# Patient Record
Sex: MALE | Race: WHITE
[De-identification: names, ages, dates, MRNs, and addresses within clinical notes are randomized per-mention and may not be internally consistent; named-entity substitution may affect disease eponyms.]

---

## 2017-11-29 NOTE — ER DOCUMENT REPORT
ED Cardiac





- General


Chief Complaint: Chest Pain


Stated Complaint: CHEST PAIN


Time Seen by Provider: 11/29/17 15:40


Mode of Arrival: Ambulatory


Information source: Patient


Notes: 





This is a 57-year-old man status post recent coronary stent placement ([place])

, presented to the emergency room with retrosternal chest pain similar to 

previous cardiac symptoms.  Patient states that he had the pain yesterday and 

it resolved after 2 nitroglycerin.  He pain today, had taken a nitroglycerin, 

it did not improve and he came to the ER.  In the ER, he was given IV morphine 

and the pain resolved after that.  The patient states that the pain lasted 

approximately 45 minutes and total today.


TRAVEL OUTSIDE OF THE [place] IN LAST 30 DAYS: No





- HPI


Patient complains to provider of: Chest pain


Use of: denies: Alcohol, Amphetamines, Bath salts, Caffeine, Cocaine, 

Decongestants, Other


Quality of pain: Achy, Dull


Chest pain radiation location: Left shoulder


Severity now: Mild


Severity at worst: Moderate


Pain level currently: 1


Cardiac risk factors: None


Positive cardiac history: No


Associated symptoms: None


Exacerbated by: Denies


Relieved by: Nothing


Similar symptoms previously: Yes


Recently seen / treated by doctor: Yes





- Related Data


Allergies/Adverse Reactions: 


 





No Known Allergies Allergy (Verified 11/29/17 15:03)


 











Past Medical History





- General


Information source: Patient





- Social History


Smoking Status: Current Every Day Smoker


Cigarette use (# per day): Yes - Half pack per day


Chew tobacco use (# tins/day): No


Smoking Education Provided: No


Frequency of alcohol use: None


Drug Abuse: None


Lives with: Spouse/Significant other


Family History: Reviewed & Not Pertinent


Patient has suicidal ideation: No


Patient has homicidal ideation: No





- Past Medical History


Cardiac Medical History: Reports: Hx Coronary Artery Disease, Hx 

Hypercholesterolemia, Hx Hypertension


Pulmonary Medical History: 


   Denies: Hx Tuberculosis


Endocrine Medical History: Reports: Hx Diabetes Mellitus Type 2


Past Surgical History: Reports: Hx Appendectomy





- Immunizations


Hx Diphtheria, Pertussis, Tetanus Vaccination: No - unknown


Hx Pneumococcal Vaccination: 10/01/11





Review of Systems





- Review of Systems


Constitutional: denies: Chills, Fever


EENT: No symptoms reported


Cardiovascular: See HPI


Respiratory: No symptoms reported


Gastrointestinal: No symptoms reported


Genitourinary: No symptoms reported


Male Genitourinary: No symptoms reported


Musculoskeletal: No symptoms reported


Skin: No symptoms reported


Hematologic/Lymphatic: No symptoms reported


Neurological/Psychological: No symptoms reported





Physical Exam





- Vital signs


Vitals: 


 











Resp BP Pulse Ox


 


 19   112/77   96 


 


 11/29/17 16:01  11/29/17 16:01  11/29/17 16:01











Notes: 





Physical exam:


 


GENERAL: The 7-year-old man, alert and oriented 3, no acute distress.





HEAD: Atraumatic, normocephalic.





EYES: Pupils equal round and reactive to light, extraocular movements intact, 

sclera anicteric, conjunctiva are normal.





ENT: TMs normal, nares patent, oropharynx clear without exudates.  Moist mucous 

membranes.





NECK: Normal range of motion, supple without obvious mass or JVD.





LUNGS: Breath sounds clear to auscultation bilaterally and equal.  No wheezes 

rales or rhonchi.





HEART: Regular rate and rhythm without murmurs, rubs or gallops.





ABDOMEN: Soft, normoactive bowel sounds.  No tenderness to palpation.  No 

guarding, no rebound.  No masses appreciated.





EXTREMITIES: Normal range of motion, no pitting or edema.  No clubbing or 

cyanosis.





NEUROLOGICAL: Cranial nerves II through XII grossly intact.  Normal speech, 

moving all extremities.





PSYCH: Normal mood, normal affect.





SKIN: Warm, Dry, normal turgor, no rashes or lesions noted.





Course





- Re-evaluation


Re-evalutation: 





11/29/17 16:22


Note: Patient taken 2 nitroglycerin prior to arrival without significant 

relief.  He became chest pain-free after 4 mg of IV morphine.  The IV nitro 

drip had not been started at that point.  Will treat with Nitropaste.  The 

repeat EKG is essentially unchanged.  There was slight difference in the 2 EKGs 

because the leads were not Done in between the serial EKGs (i.e. the leads had 

to be replaced for the second EKG).  Continue to observe in the ER.


11/29/17 16:23





11/29/17 22:23


The patient was observed in the ER and the troponin was repeated.  There was a 

slight elevation from the previous one.  I discussed this and given the patient'

s significant cardiac history, I recommended transfer to [place].  I had a 

long discussion with the patient because he is adamantly against it.  I 

discussed also with the patient's wife is at the bedside.  The patient is 

mentating clearly and appears reasonable, he states that he will follow-up with 

the cardiologist in the morning.  I have watched him for a prolonged period in 

the ER and he has been chest pain-free for the last several hours.  I have 

given him a copy of today's labs.  He will leave AMA: He is aware of this.  I 

have also assured him that if he gets any further pain, to return here at once.


11/29/17 22:24








- Vital Signs


Vital signs: 


 











Temp Pulse Resp BP Pulse Ox


 


 97.4 F      18   133/88 H  96 


 


 11/29/17 22:27     11/29/17 22:16  11/29/17 22:16  11/29/17 22:16














- Laboratory


Result Diagrams: 


 11/29/17 15:41





 11/29/17 15:41


Laboratory results interpreted by me: 


 











  11/29/17 11/29/17





  15:41 15:41


 


WBC  10.7 H 


 


RDW  15.3 H 


 


Chloride   110 H


 


Creatine Kinase   50 L














- Diagnostic Test


Radiology reviewed: Image reviewed, Reports reviewed - Chest x-ray shows no 

infiltrates or effusions.





- EKG Interpretation by Me


Rate: Normal


Rhythm: NSR - EKG shows normal sinus rhythm with a ventricular rate of 63, 

there are inverted T waves in V1 and V2, no ST elevations,.  Last EKG for 

comparison is 2014 (this is prior to all of his cardiac interventions).





Discharge





- Discharge


Clinical Impression: 


 Chest pain





Condition: Stable


Disposition: AGAINST MEDICAL ADVICE


Additional Instructions: 


As we discussed, I would like you to call your cardiologist first thing in the 

morning.  Bring a copy of today's lab work with you.


In the meantime, if you get any further chest pain, return to the emergency 

room at once.


Continue aspirin and other medicines.

## 2017-11-29 NOTE — EKG REPORT
SEVERITY:- ABNORMAL ECG -

SINUS RHYTHM

NONSPECIFIC  ANTERIOR ST-T INVERSION

:

Confirmed by: Jayden Deluca MD 29-Nov-2017 20:14:48

## 2017-11-29 NOTE — RADIOLOGY REPORT (SQ)
EXAM DESCRIPTION:  CHEST SINGLE VIEW



COMPLETED DATE/TIME:  11/29/2017 4:13 pm



REASON FOR STUDY:  chest pain



COMPARISON:  12/9/2014.



EXAM PARAMETERS:  NUMBER OF VIEWS: One view.

TECHNIQUE: Single frontal radiographic view of the chest acquired.

RADIATION DOSE: NA

LIMITATIONS: None.



FINDINGS:  LUNGS AND PLEURA: No opacities, masses or pneumothorax. No pleural effusion.

MEDIASTINUM AND HILAR STRUCTURES: No masses.  Contour normal.

HEART AND VASCULAR STRUCTURES: Heart normal in size.  Normal vasculature.

BONES: No acute findings.

HARDWARE: None in the chest.

OTHER: No other significant finding.



IMPRESSION:  NO ACUTE RADIOGRAPHIC FINDING IN THE CHEST.



TECHNICAL DOCUMENTATION:  JOB ID:  [id number]

 2011 [employer]- All Rights Reserved

## 2017-11-29 NOTE — EKG REPORT
SEVERITY:- ABNORMAL ECG -

SINUS RHYTHM

ABNORMAL T, CONSIDER ISCHEMIA, ANTERIOR LEADS

BORDERLINE ST ELEVATION, INFERIOR LEADS

:

Confirmed by: Jayden Deluca MD 29-Nov-2017 20:13:43

## 2018-04-06 NOTE — ER DOCUMENT REPORT
ED Extremity Problem, Lower





- General


Chief Complaint: Leg Pain


Stated Complaint: LEG PAIN


Time Seen by Provider: 04/06/18 16:40


Notes: 





Patient has a history of a metal blanka insertion in his right femur a year or so 

ago, which developed osteomyelitis.  He has had repeat surgeries to drain 

infections.   He has an appointment Monday to be seen by a specialist at [facility] in 

[place], but is out of pain medications at this time.  When he has pain 

like he is currently having, patient says that his blood pressure goes up. He 

says that the distal incision on the right thigh has been leaking for about the 

past month.  He is currently being maintained on doxycycline.  Has not noted 

any fever.  Walks with a slight limp, but is able to ambulate relatively well.


TRAVEL OUTSIDE OF THE [place] IN LAST 30 DAYS: No





- Related Data


Allergies/Adverse Reactions: 


 





No Known Allergies Allergy (Verified 04/06/18 16:18)


 











Past Medical History





- Social History


Smoking Status: Current Every Day Smoker


Chew tobacco use (# tins/day): No


Frequency of alcohol use: None


Drug Abuse: None


Family History: Reviewed & Not Pertinent


Patient has suicidal ideation: No


Patient has homicidal ideation: No





- Past Medical History


Cardiac Medical History: Reports: Hx Coronary Artery Disease, Hx 

Hypercholesterolemia, Hx Hypertension


Endocrine Medical History: Reports: Hx Diabetes Mellitus Type 2


GI Medical History: Reports: Hx Hiatal Hernia


Past Surgical History: Reports: Hx Appendectomy, Hx Cardiac Catheterization - 

stents 2 seperate times same one, Hx Orthopedic Surgery - titanium blanka rt leg





- Immunizations


Hx Diphtheria, Pertussis, Tetanus Vaccination: No - unknown


Hx Pneumococcal Vaccination: 10/01/11





Review of Systems





- Review of Systems


Notes: 





CONSTITUTIONAL :  Denies fever.


  


CARDIOVASCULAR:  Denies chest pain.





RESPIRATORY:  Denies cough, chest congestion, or shortness of breath.





GASTROINTESTINAL:  Denies abdominal pain or nausea, vomiting, or diarrhea.





GENITOURINARY:  Denies difficulty or painful urinating, urinary frequency, 

blood in urine.





Extremities: Healed incision except for a small opening distally in the right 

medial thigh without signs of infection.








Physical Exam





- Vital signs


Vitals: 


 











Temp Pulse Resp BP Pulse Ox


 


 97.9 F   82   20   168/112 H  96 


 


 04/06/18 16:22  04/06/18 16:22  04/06/18 16:22  04/06/18 16:22  04/06/18 16:22











Interpretation: Hypertensive - Moderate





- Notes


Notes: 





PHYSICAL EXAMINATION:





GENERAL: Well-appearing, in no acute distress.  Blood pressure up moderately.





HEAD: Atraumatic, normocephalic.





LUNGS: Breath sounds clear and equal bilaterally.





HEART: Regular rate and rhythm without murmurs.





ABDOMEN: Soft, nontender.  No guarding or rebound.  No masses.





BACK:  No tenderness throughout entire back.





EXTREMITIES: Normal range of motion without pain.  Patient has a well-healed 

incision of the right medial thigh with only the distal portion having an 

opening of about 1 cm diameter through which the patient says he has copious 

drainage daily.  However, there is no drainage from the site at this time.  

There is also no swelling or anything suggestive of fluctuance or abscess 

collection.  There is no erythema, heat, or even very significant tenderness to 

touch.





NEUROLOGICAL:  Normal speech, normal gait.  Normal sensory, motor, and reflex 

exams.  Awake, alert, and oriented x3.  Cranial nerves normal.





PSYCH: Normal mood, normal affect.





SKIN: Warm, dry, no rashes.





Course





- Re-evaluation


Re-evalutation: 





04/06/18 19:24


Patient's x-ray shows nothing specific.  White cell count slightly elevated.  

Patient is on doxycycline already.  He is also on blood pressure medicines.  

Blood pressure taken just before discharge was down compared to his initial 

presentation.





Since patient has a definite cut off date that he is going to be seeing another 

doctor who can manage his pain, I do not mind giving the patient does not 

Percocet tens today and I told him that we will not be able to continue to give 

him pain medications for his leg, that will have to be done by his doctor 

locally or the specialist in [place].  Patient says he understands.





- Vital Signs


Vital signs: 


 











Temp Pulse Resp BP Pulse Ox


 


 98.3 F   78   16   169/103 H  95 


 


 04/06/18 17:32  04/06/18 17:32  04/06/18 17:32  04/06/18 17:32  04/06/18 17:32














- Laboratory


Result Diagrams: 


 04/06/18 16:55





 04/06/18 16:55


Laboratory results interpreted by me: 


 











  04/06/18





  16:55


 


WBC  11.7 H


 


RBC  5.83 H


 


RDW  15.5 H


 


ESR  31 H














Discharge





- Discharge


Clinical Impression: 


 Right thigh pain, Osteomyelitis of femur





Condition: Stable


Disposition: HOME, SELF-CARE


Additional Instructions: 


Leg Pain, Nonspecific





     We did not find an obvious cause for your leg pain. There's no sign of 

blood clot, infection, or other serious disease.


     Possible causes of vague leg pain include muscle or joint inflammation, 

disc disease in the lower back, pressure on the nerves in the back, or reduced 

blood flow through the arteries of the leg.


     Rest the leg. Pain can be eased with an antiinflammatory pain medicine 

such as ibuprofen. If the pain involves a small area, a heating pad might help. 


     Call the doctor or return if the leg becomes swollen, weak, discolored, or 

increasingly painful, or if you develop any other significant change in your 

health.





Osteomyelitis





     You have been diagnosed as having osteomyelitis -- an infection in the 

bone.  This type of infection is much more serious than simple skin infections 

and must be treated aggressively.  Osteomyelitis usually results from cuts or 

puncture wounds which allow germs to get into the bone.  It can also occur 

spontaneously from germs in the blood stream.


     The usual treatment is intravenous antibiotics.  With newer antibiotics, 

this can often be done outside the hospital.  Surgery to clean out the infected 

bone is often necessary.  You MUST keep all appointments and get your 

antibiotics as instructed -- osteomyelitis is a serious problem.


     You should go to the emergency room or contact your physician if you have 

a dramatic increase in pain, redness, or swelling, or if you develop shaking 

chills, fever, or rash.








Oral Narcotic Medication





     You have been given a prescription for pain control.  This medication is a 

narcotic.  It's best taken with food, as nausea can result if taken on an empty 

stomach.


     Don't operate machinery or drive within six hours of taking this 

medication.  Do not combine this medicine with alcohol, or with any medication 

which can cause sedation (such as cold tablets or sleeping pills) unless you 

get permission from the physician.


     Narcotics tend to cause constipation.  If possible, drink plenty of fluids 

and eat a diet high in fiber and fruits.








Continue to take your current medications including your antibiotic.  Take your 

blood pressure medicines as prescribed.  Keep your appointment to see your 

specialist at [facility] in [place] Monday, as scheduled.





FOLLOW-UP CARE:


If you have been referred to a physician for follow-up care, call the physician

s office for an appointment as you were instructed or within the next two days.

  If you experience worsening or a significant change in your symptoms, notify 

the physician immediately or return to the Emergency Department at any time for 

re-evaluation.


Prescriptions: 


Oxycodone HCl/Acetaminophen [Percocet  Mg Tablet] 1 each PO Q4HP PRN #12 

tablet


 PRN Reason:

## 2018-04-06 NOTE — ER DOCUMENT REPORT
ED Medical Screen (RME)





- General


Chief Complaint: Leg Pain


Stated Complaint: LEG PAIN


Time Seen by Provider: 04/06/18 16:40


Notes: 





RME DISCLOSURE


I have seen this patient as part of a Rapid Medical Evaluation and, if 

applicable, placed any initially appropriate orders. The patient will be seen 

and fully evaluated, including a full history and physical exam, by a provider (

in Main ED or Fast Track) when a room becomes available.





------------------------------------------------------------------





57-year-old male here with complaints of right thigh pain ongoing for the past 

few months but progressively worsening over the past few days.  He states that 

he has a history of osteomyelitis and that he has noticed over the recent past 

that he has yellow purulence running down his leg especially at night.  His 

orthopedic doctor is at [facility].


TRAVEL OUTSIDE OF THE [place] IN LAST 30 DAYS: No





- Related Data


Allergies/Adverse Reactions: 


 





No Known Allergies Allergy (Verified 04/06/18 16:18)


 











Past Medical History





- Social History


Chew tobacco use (# tins/day): No


Frequency of alcohol use: None


Drug Abuse: None





- Past Medical History


Cardiac Medical History: Reports: Hx Coronary Artery Disease, Hx 

Hypercholesterolemia, Hx Hypertension


Pulmonary Medical History: 


   Denies: Hx Tuberculosis


Endocrine Medical History: Reports: Hx Diabetes Mellitus Type 2


Renal/ Medical History: Denies: Hx Peritoneal Dialysis


GI Medical History: Reports: Hx Hiatal Hernia


Past Surgical History: Reports: Hx Appendectomy, Hx Cardiac Catheterization - 

stents 2 seperate times same one, Hx Orthopedic Surgery - titanium blanka rt leg





- Immunizations


Hx Diphtheria, Pertussis, Tetanus Vaccination: No - unknown





Physical Exam





- Vital signs


Vitals: 





 











Temp Pulse Resp BP Pulse Ox


 


 97.9 F   82   20   168/112 H  96 


 


 04/06/18 16:22  04/06/18 16:22  04/06/18 16:22  04/06/18 16:22  04/06/18 16:22














Course





- Vital Signs


Vital signs: 





 











Temp Pulse Resp BP Pulse Ox


 


 97.9 F   82   20   168/112 H  96 


 


 04/06/18 16:22  04/06/18 16:22  04/06/18 16:22  04/06/18 16:22  04/06/18 16:22

## 2018-04-06 NOTE — RADIOLOGY REPORT (SQ)
EXAM DESCRIPTION:  FEMUR RIGHT



COMPLETED DATE/TIME:  4/6/2018 5:25 pm



REASON FOR STUDY:  purulence from titanium blanka; eval osteomyelitis



COMPARISON:  None.



NUMBER OF VIEWS:  Two views.



TECHNIQUE:  Two radiographic images acquired of the right femur to include hip and knee in at least o
ne projection.



LIMITATIONS:  None.



FINDINGS:  MINERALIZATION: The mineralization in the femoral metaphysis is somewhat heterogeneous.

BONES: No acute fracture.

SOFT TISSUES: No obvious swelling or foreign body.

OTHER: There is long medullary blanka in the femur and two long screws in the femoral metaphysis.



IMPRESSION:  Cannot exclude osteomyelitis in the distal femur.



TECHNICAL DOCUMENTATION:  JOB ID:  [id number]

 2011 [employer]- All Rights Reserved



Reading location - IP/workstation name: [email or web address]

## 2018-05-09 ENCOUNTER — HOSPITAL ENCOUNTER (EMERGENCY)
Dept: HOSPITAL 62 - ER | Age: 58
Discharge: LEFT BEFORE DISCHARGE | End: 2018-05-09
Payer: SELF-PAY

## 2018-05-09 VITALS — SYSTOLIC BLOOD PRESSURE: 145 MMHG | DIASTOLIC BLOOD PRESSURE: 95 MMHG

## 2018-05-09 DIAGNOSIS — I10: ICD-10-CM

## 2018-05-09 DIAGNOSIS — I25.10: ICD-10-CM

## 2018-05-09 DIAGNOSIS — E11.9: ICD-10-CM

## 2018-05-09 DIAGNOSIS — Z53.20: ICD-10-CM

## 2018-05-09 DIAGNOSIS — M79.604: Primary | ICD-10-CM

## 2018-05-09 LAB
ADD MANUAL DIFF: NO
ANION GAP SERPL CALC-SCNC: 15 MMOL/L (ref 5–19)
BASOPHILS # BLD AUTO: 0.1 10^3/UL (ref 0–0.2)
BASOPHILS NFR BLD AUTO: 0.8 % (ref 0–2)
BUN SERPL-MCNC: 13 MG/DL (ref 7–20)
CALCIUM: 9.8 MG/DL (ref 8.4–10.2)
CHLORIDE SERPL-SCNC: 105 MMOL/L (ref 98–107)
CK SERPL-CCNC: 52 U/L (ref 55–170)
CO2 SERPL-SCNC: 24 MMOL/L (ref 22–30)
CRP SERPL-MCNC: 41.4 MG/L (ref ?–10)
EOSINOPHIL # BLD AUTO: 0.2 10^3/UL (ref 0–0.6)
EOSINOPHIL NFR BLD AUTO: 1.6 % (ref 0–6)
ERYTHROCYTE [DISTWIDTH] IN BLOOD BY AUTOMATED COUNT: 15.1 % (ref 11.5–14)
ERYTHROCYTE [SEDIMENTATION RATE] IN BLOOD: 44 MM/HR (ref 0–20)
GLUCOSE SERPL-MCNC: 113 MG/DL (ref 75–110)
HCT VFR BLD CALC: 46.7 % (ref 37.9–51)
HGB BLD-MCNC: 15.5 G/DL (ref 13.5–17)
LYMPHOCYTES # BLD AUTO: 2.7 10^3/UL (ref 0.5–4.7)
LYMPHOCYTES NFR BLD AUTO: 23.5 % (ref 13–45)
MCH RBC QN AUTO: 26.9 PG (ref 27–33.4)
MCHC RBC AUTO-ENTMCNC: 33.1 G/DL (ref 32–36)
MCV RBC AUTO: 81 FL (ref 80–97)
MONOCYTES # BLD AUTO: 1 10^3/UL (ref 0.1–1.4)
MONOCYTES NFR BLD AUTO: 8.5 % (ref 3–13)
NEUTROPHILS # BLD AUTO: 7.6 10^3/UL (ref 1.7–8.2)
NEUTS SEG NFR BLD AUTO: 65.6 % (ref 42–78)
PLATELET # BLD: 302 10^3/UL (ref 150–450)
POTASSIUM SERPL-SCNC: 4.5 MMOL/L (ref 3.6–5)
RBC # BLD AUTO: 5.74 10^6/UL (ref 4.35–5.55)
SODIUM SERPL-SCNC: 143.6 MMOL/L (ref 137–145)
TOTAL CELLS COUNTED % (AUTO): 100 %
WBC # BLD AUTO: 11.5 10^3/UL (ref 4–10.5)

## 2018-05-09 PROCEDURE — 82550 ASSAY OF CK (CPK): CPT

## 2018-05-09 PROCEDURE — 86140 C-REACTIVE PROTEIN: CPT

## 2018-05-09 PROCEDURE — 36415 COLL VENOUS BLD VENIPUNCTURE: CPT

## 2018-05-09 PROCEDURE — 80048 BASIC METABOLIC PNL TOTAL CA: CPT

## 2018-05-09 PROCEDURE — 85025 COMPLETE CBC W/AUTO DIFF WBC: CPT

## 2018-05-09 PROCEDURE — 87040 BLOOD CULTURE FOR BACTERIA: CPT

## 2018-05-09 PROCEDURE — 85652 RBC SED RATE AUTOMATED: CPT

## 2018-05-09 NOTE — ER DOCUMENT REPORT
ED Medical Screen (RME)





- General


Chief Complaint: Leg Pain


Stated Complaint: RIGHT LEG PAIN


Time Seen by Provider: 05/09/18 15:21


TRAVEL OUTSIDE OF THE [place] IN LAST 30 DAYS: No





- HPI


Notes: 





05/09/18 15:31


Patient coming in for chronic draining right leg wound states fevers last few 

days states he follows up with [facility].  Last visit states patient follows up at 

[facility].  Patient otherwise looks to be no obvious distress ambulating without 

difficulty





- Related Data


Allergies/Adverse Reactions: 


 





No Known Allergies Allergy (Verified 04/06/18 16:18)


 











Past Medical History





- Social History


Drug Abuse: None





- Past Medical History


Cardiac Medical History: Reports: Hx Coronary Artery Disease, Hx 

Hypercholesterolemia, Hx Hypertension


Pulmonary Medical History: 


   Denies: Hx Tuberculosis


Endocrine Medical History: Reports: Hx Diabetes Mellitus Type 2


Renal/ Medical History: Denies: Hx Peritoneal Dialysis


GI Medical History: Reports: Hx Hiatal Hernia


Past Surgical History: Reports: Hx Appendectomy, Hx Cardiac Catheterization - 

stents 2 seperate times same one, Hx Orthopedic Surgery - titanium blanka rt leg





- Immunizations


Hx Diphtheria, Pertussis, Tetanus Vaccination: No - unknown





Review of Systems





- Review of Systems


Musculoskeletal: Other - Chronic leg wound





Physical Exam





- Vital signs


Vitals: 





 











Temp Pulse Resp BP Pulse Ox


 


 97.8 F   81   20   145/95 H  95 


 


 05/09/18 15:16  05/09/18 15:16  05/09/18 15:16  05/09/18 15:16  05/09/18 15:16














- General


General appearance: Appears well


In distress: None





Course





- Vital Signs


Vital signs: 





 











Temp Pulse Resp BP Pulse Ox


 


 97.8 F   81   20   145/95 H  95 


 


 05/09/18 15:16  05/09/18 15:16  05/09/18 15:16  05/09/18 15:16  05/09/18 15:16